# Patient Record
Sex: FEMALE | Race: WHITE | NOT HISPANIC OR LATINO | URBAN - METROPOLITAN AREA
[De-identification: names, ages, dates, MRNs, and addresses within clinical notes are randomized per-mention and may not be internally consistent; named-entity substitution may affect disease eponyms.]

---

## 2024-09-14 ENCOUNTER — OFFICE VISIT (OUTPATIENT)
Dept: URGENT CARE | Age: 19
End: 2024-09-14
Payer: COMMERCIAL

## 2024-09-14 VITALS
SYSTOLIC BLOOD PRESSURE: 123 MMHG | RESPIRATION RATE: 18 BRPM | OXYGEN SATURATION: 99 % | DIASTOLIC BLOOD PRESSURE: 82 MMHG | TEMPERATURE: 98.6 F | HEART RATE: 85 BPM

## 2024-09-14 DIAGNOSIS — R10.13 EPIGASTRIC PAIN: Primary | ICD-10-CM

## 2024-09-14 ASSESSMENT — ENCOUNTER SYMPTOMS
DIARRHEA: 0
ARTHRALGIAS: 0
HEADACHES: 0
MYALGIAS: 0
HEMATURIA: 0
WEIGHT LOSS: 0
FEVER: 0
VOMITING: 0
HEMATOCHEZIA: 0
FREQUENCY: 0
CHEST TIGHTNESS: 0
FLANK PAIN: 0
SHORTNESS OF BREATH: 0
BELCHING: 0
ANOREXIA: 0
ABDOMINAL PAIN: 1
FLATUS: 0
DYSURIA: 0
NAUSEA: 1
CONSTIPATION: 0
COUGH: 0

## 2024-09-14 NOTE — PROGRESS NOTES
Subjective   Patient ID: Kalpana Haney is a 19 y.o. female. They present today with a chief complaint of Abdominal Pain (Epigastric region. Pain ranges 5/10 to 8/10. Symptoms have been intermittent for years. ).    History of Present Illness  Patient presents with 2 day history intermittent epigsatric pain. She was seen in the ER for same several years ago. She states she had a normal abdominal ultrasound. No Tx attempted. Denies smoking, alcohol. LMP: 8/23.      Abdominal Pain  This is a recurrent problem. The current episode started yesterday. The problem occurs intermittently. The pain is located in the epigastric region. The pain is at a severity of 2/10. The pain is mild. The abdominal pain does not radiate. Associated symptoms include nausea. Pertinent negatives include no anorexia, arthralgias, belching, constipation, diarrhea, dysuria, fever, flatus, frequency, headaches, hematochezia, hematuria, melena, myalgias, vomiting or weight loss. Nothing aggravates the pain. The pain is relieved by Liquids. She has tried nothing for the symptoms. Prior diagnostic workup includes ultrasound. There is no history of abdominal surgery, gallstones, GERD or pancreatitis.       Past Medical History  Allergies as of 09/14/2024    (No Known Allergies)       (Not in a hospital admission)       No past medical history on file.    No past surgical history on file.         Review of Systems  Review of Systems   Constitutional:  Negative for fever and weight loss.   Respiratory:  Negative for cough, chest tightness and shortness of breath.    Cardiovascular:  Negative for chest pain.   Gastrointestinal:  Positive for abdominal pain and nausea. Negative for anorexia, constipation, diarrhea, flatus, hematochezia, melena and vomiting.   Genitourinary:  Negative for dysuria, flank pain, frequency, hematuria, menstrual problem, pelvic pain and urgency.   Musculoskeletal:  Negative for arthralgias and myalgias.   Neurological:  Negative  for headaches.                                  Objective    Vitals:    09/14/24 1846   BP: 123/82   Pulse: 85   Resp: 18   Temp: 37 °C (98.6 °F)   SpO2: 99%     No LMP recorded.    Physical Exam  Vitals and nursing note reviewed.   Constitutional:       General: She is not in acute distress.     Appearance: Normal appearance. She is normal weight. She is not ill-appearing.   HENT:      Head: Normocephalic.   Cardiovascular:      Rate and Rhythm: Normal rate and regular rhythm.      Heart sounds: Normal heart sounds.   Pulmonary:      Effort: Pulmonary effort is normal.      Breath sounds: Normal breath sounds.   Abdominal:      General: Abdomen is flat. Bowel sounds are normal.      Palpations: Abdomen is soft.      Tenderness: There is abdominal tenderness in the epigastric area. There is no right CVA tenderness, left CVA tenderness, guarding or rebound. Negative signs include Gonzales's sign and psoas sign.   Neurological:      Mental Status: She is alert.         Procedures    Point of Care Test & Imaging Results from this visit  No results found for this visit on 09/14/24.   No results found.    Diagnostic study results (if any) were reviewed by [facility].    Assessment/Plan   Allergies, medications, history, and pertinent labs/EKGs/Imaging reviewed by Malena Campo PA-C.     Medical Decision Making  Patient presents with acute on chronic epigastric pain that she reports is mild.  Ddx: gastritis, H pylori, cholelithiasis, less likely pancreatitis. Unknown triggers. Denies UTI symptoms and declined Urinalysis. We discussed limitations of what we can test for here in urgent care. Recommended OTC Tums or Prilosec. Advised to call PCP for appointment next week if symptoms stay mild or to go to the ER in the interim with any acute worsening. She declined going to ER at time of visit.    Orders and Diagnoses  Diagnoses and all orders for this visit:  Epigastric pain      Medical Admin Record      Follow Up  Instructions  No follow-ups on file.    Patient disposition: Home    Electronically signed by [facility]  6:58 PM